# Patient Record
Sex: MALE | Race: WHITE | NOT HISPANIC OR LATINO | Employment: FULL TIME | URBAN - METROPOLITAN AREA
[De-identification: names, ages, dates, MRNs, and addresses within clinical notes are randomized per-mention and may not be internally consistent; named-entity substitution may affect disease eponyms.]

---
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
## 2023-01-30 ENCOUNTER — OFFICE VISIT (OUTPATIENT)
Dept: FAMILY MEDICINE CLINIC | Facility: CLINIC | Age: 18
End: 2023-01-30
Payer: COMMERCIAL

## 2023-01-30 VITALS
HEART RATE: 55 BPM | TEMPERATURE: 97.3 F | DIASTOLIC BLOOD PRESSURE: 62 MMHG | WEIGHT: 163.4 LBS | OXYGEN SATURATION: 98 % | SYSTOLIC BLOOD PRESSURE: 115 MMHG | BODY MASS INDEX: 22.16 KG/M2

## 2023-01-30 DIAGNOSIS — F51.01 PRIMARY INSOMNIA: Primary | ICD-10-CM

## 2023-01-30 DIAGNOSIS — F41.9 ANXIETY AND DEPRESSION: ICD-10-CM

## 2023-01-30 DIAGNOSIS — F32.A ANXIETY AND DEPRESSION: ICD-10-CM

## 2023-01-30 PROCEDURE — 99213 OFFICE O/P EST LOW 20 MIN: CPT | Performed by: NURSE PRACTITIONER

## 2023-01-30 RX ORDER — LAMOTRIGINE 25 MG/1
50 TABLET ORAL 2 TIMES DAILY
Qty: 120 TABLET | Refills: 1 | Status: SHIPPED | OUTPATIENT
Start: 2023-01-30 | End: 2023-02-16

## 2023-01-30 RX ORDER — TRAZODONE HYDROCHLORIDE 50 MG/1
TABLET ORAL
Qty: 60 TABLET | Refills: 2 | Status: SHIPPED | OUTPATIENT
Start: 2023-01-30

## 2023-01-30 NOTE — PATIENT INSTRUCTIONS
Generalized Anxiety Disorder, Adult  Generalized anxiety disorder (JUDY) is a mental health condition. Unlike normal worries, anxiety related to JUDY is not triggered by a specific event. These worries do not fade or get better with time. JUDY interferes with relationships, work, and school.  JUDY symptoms can vary from mild to severe. People with severe JUDY can have intense waves of anxiety with physical symptoms that are similar to panic attacks.  What are the causes?  The exact cause of JUDY is not known, but the following are believed to have an impact:  Differences in natural brain chemicals.  Genes passed down from parents to children.  Differences in the way threats are perceived.  Development and stress during childhood.  Personality.  What increases the risk?  The following factors may make you more likely to develop this condition:  Being female.  Having a family history of anxiety disorders.  Being very shy.  Experiencing very stressful life events, such as the death of a loved one.  Having a very stressful family environment.  What are the signs or symptoms?  People with JUDY often worry excessively about many things in their lives, such as their health and family. Symptoms may also include:  Mental and emotional symptoms:  Worrying excessively about natural disasters.  Fear of being late.  Difficulty concentrating.  Fears that others are judging your performance.  Physical symptoms:  Fatigue.  Headaches, muscle tension, muscle twitches, trembling, or feeling shaky.  Feeling like your heart is pounding or beating very fast.  Feeling out of breath or like you cannot take a deep breath.  Having trouble falling asleep or staying asleep, or experiencing restlessness.  Sweating.  Nausea, diarrhea, or irritable bowel syndrome (IBS).  Behavioral symptoms:  Experiencing erratic moods or irritability.  Avoidance of new situations.  Avoidance of people.  Extreme difficulty making decisions.  How is this diagnosed?  This  condition is diagnosed based on your symptoms and medical history. You will also have a physical exam. Your health care provider may perform tests to rule out other possible causes of your symptoms.  To be diagnosed with JUDY, a person must have anxiety that:  Is out of his or her control.  Affects several different aspects of his or her life, such as work and relationships.  Causes distress that makes him or her unable to take part in normal activities.  Includes at least three symptoms of JUDY, such as restlessness, fatigue, trouble concentrating, irritability, muscle tension, or sleep problems.  Before your health care provider can confirm a diagnosis of JUDY, these symptoms must be present more days than they are not, and they must last for 6 months or longer.  How is this treated?  This condition may be treated with:  Medicine. Antidepressant medicine is usually prescribed for long-term daily control. Anti-anxiety medicines may be added in severe cases, especially when panic attacks occur.  Talk therapy (psychotherapy). Certain types of talk therapy can be helpful in treating JUDY by providing support, education, and guidance. Options include:  Cognitive behavioral therapy (CBT). People learn coping skills and self-calming techniques to ease their physical symptoms. They learn to identify unrealistic thoughts and behaviors and to replace them with more appropriate thoughts and behaviors.  Acceptance and commitment therapy (ACT). This treatment teaches people how to be mindful as a way to cope with unwanted thoughts and feelings.  Biofeedback. This process trains you to manage your body's response (physiological response) through breathing techniques and relaxation methods. You will work with a therapist while machines are used to monitor your physical symptoms.  Stress management techniques. These include yoga, meditation, and exercise.  A mental health specialist can help determine which treatment is best for you.  Some people see improvement with one type of therapy. However, other people require a combination of therapies.  Follow these instructions at home:  Lifestyle  Maintain a consistent routine and schedule.  Anticipate stressful situations. Create a plan and allow extra time to work with your plan.  Practice stress management or self-calming techniques that you have learned from your therapist or your health care provider.  Exercise regularly and spend time outdoors.  Eat a healthy diet that includes plenty of vegetables, fruits, whole grains, low-fat dairy products, and lean protein.  Do not eat a lot of foods that are high in fat, added sugar, or salt (sodium).  Drink plenty of water.  Avoid alcohol. Alcohol can increase anxiety.  Avoid caffeine and certain over-the-counter cold medicines. These may make you feel worse. Ask your pharmacist which medicines to avoid.  General instructions  Take over-the-counter and prescription medicines only as told by your health care provider.  Understand that you are likely to have setbacks. Accept this and be kind to yourself as you persist to take better care of yourself.  Anticipate stressful situations. Create a plan and allow extra time to work with your plan.  Recognize and accept your accomplishments, even if you  them as small.  Spend time with people who care about you.  Keep all follow-up visits. This is important.  Where to find more information  [employer]: www.nimh.nih.gov  Substance Abuse and Mental Health Services: www.samhsa.gov  Contact a health care provider if:  Your symptoms do not get better.  Your symptoms get worse.  You have signs of depression, such as:  A persistently sad or irritable mood.  Loss of enjoyment in activities that used to bring you mikey.  Change in weight or eating.  Changes in sleeping habits.  Get help right away if:  You have thoughts about hurting yourself or others.  If you ever feel like you may hurt  yourself or others, or have thoughts about taking your own life, get help right away. Go to your nearest emergency department or:  Call your local emergency services (961 in the [place]  Call a suicide crisis helpline, such as the National Suicide Prevention Lifeline at 1-802.699.4860 or 614 in the [place] This is open 24 hours a day in the [place]  Text the Crisis Text Line at 852585 (in the [place]  Summary  Generalized anxiety disorder (JUDY) is a mental health condition that involves worry that is not triggered by a specific event.  People with JUDY often worry excessively about many things in their lives, such as their health and family.  JUDY may cause symptoms such as restlessness, trouble concentrating, sleep problems, frequent sweating, nausea, diarrhea, headaches, and trembling or muscle twitching.  A mental health specialist can help determine which treatment is best for you. Some people see improvement with one type of therapy. However, other people require a combination of therapies.  This information is not intended to replace advice given to you by your health care provider. Make sure you discuss any questions you have with your health care provider.  Document Revised: 07/13/2022 Document Reviewed: 04/10/2022  [employer] Patient Education © 2022 [employer]

## 2023-02-16 DIAGNOSIS — F32.A ANXIETY AND DEPRESSION: ICD-10-CM

## 2023-02-16 DIAGNOSIS — F41.9 ANXIETY AND DEPRESSION: ICD-10-CM

## 2023-02-16 RX ORDER — LAMOTRIGINE 25 MG/1
TABLET ORAL
Qty: 60 TABLET | Refills: 1 | Status: SHIPPED | OUTPATIENT
Start: 2023-02-16

## 2023-03-30 ENCOUNTER — TRANSCRIBE ORDERS (OUTPATIENT)
Dept: ADMINISTRATIVE | Facility: HOSPITAL | Age: 18
End: 2023-03-30
Payer: COMMERCIAL

## 2023-03-30 DIAGNOSIS — R10.13 ABDOMINAL PAIN, EPIGASTRIC: Primary | ICD-10-CM

## 2023-04-26 DIAGNOSIS — F41.9 ANXIETY AND DEPRESSION: ICD-10-CM

## 2023-04-26 DIAGNOSIS — F32.A ANXIETY AND DEPRESSION: ICD-10-CM

## 2023-04-26 RX ORDER — LAMOTRIGINE 25 MG/1
TABLET ORAL
Qty: 60 TABLET | Refills: 1 | Status: SHIPPED | OUTPATIENT
Start: 2023-04-26

## 2023-05-10 ENCOUNTER — HOSPITAL ENCOUNTER (OUTPATIENT)
Dept: GENERAL RADIOLOGY | Facility: HOSPITAL | Age: 18
Discharge: HOME | End: 2023-05-10
Admitting: NURSE PRACTITIONER
Payer: COMMERCIAL

## 2023-05-10 DIAGNOSIS — R10.13 ABDOMINAL PAIN, EPIGASTRIC: ICD-10-CM

## 2023-05-10 PROCEDURE — 74248 X-RAY SM INT F-THRU STD: CPT

## 2023-05-10 PROCEDURE — 74246 X-RAY XM UPR GI TRC 2CNTRST: CPT

## 2023-05-10 RX ADMIN — BARIUM SULFATE 135 ML: 980 POWDER, FOR SUSPENSION ORAL at 10:07

## 2023-05-10 RX ADMIN — BARIUM SULFATE 355 ML: 0.6 SUSPENSION ORAL at 10:07

## 2023-05-10 RX ADMIN — ANTACID/ANTIFLATULENT 1 PACKET: 380; 550; 10; 10 GRANULE, EFFERVESCENT ORAL at 10:07

## 2023-10-01 ENCOUNTER — APPOINTMENT (OUTPATIENT)
Dept: CT IMAGING | Facility: HOSPITAL | Age: 18
End: 2023-10-01
Payer: COMMERCIAL

## 2023-10-01 ENCOUNTER — HOSPITAL ENCOUNTER (EMERGENCY)
Facility: HOSPITAL | Age: 18
Discharge: HOME | End: 2023-10-02
Attending: EMERGENCY MEDICINE | Admitting: EMERGENCY MEDICINE
Payer: COMMERCIAL

## 2023-10-01 DIAGNOSIS — S09.90XA INJURY OF HEAD, INITIAL ENCOUNTER: ICD-10-CM
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
## 2023-10-01 DIAGNOSIS — S01.01XA LACERATION OF OCCIPITAL REGION OF SCALP, INITIAL ENCOUNTER: ICD-10-CM

## 2023-10-01 LAB
ALBUMIN SERPL-MCNC: 5.1 G/DL (ref 3.5–5.2)
ALBUMIN/GLOB SERPL: 2.2 G/DL
ALP SERPL-CCNC: 63 U/L (ref 39–117)
ALT SERPL W P-5'-P-CCNC: 14 U/L (ref 1–41)
ANION GAP SERPL CALCULATED.3IONS-SCNC: 12.6 MMOL/L (ref 5–15)
AST SERPL-CCNC: 16 U/L (ref 1–40)
BASOPHILS # BLD AUTO: 0.09 10*3/MM3 (ref 0–0.2)
BASOPHILS NFR BLD AUTO: 0.9 % (ref 0–1.5)
BILIRUB SERPL-MCNC: 0.9 MG/DL (ref 0–1.2)
BUN SERPL-MCNC: 14 MG/DL (ref 6–20)
BUN/CREAT SERPL: 13.1 (ref 7–25)
CALCIUM SPEC-SCNC: 9.8 MG/DL (ref 8.6–10.5)
CHLORIDE SERPL-SCNC: 99 MMOL/L (ref 98–107)
CO2 SERPL-SCNC: 28.4 MMOL/L (ref 22–29)
CREAT SERPL-MCNC: 1.07 MG/DL (ref 0.76–1.27)
DEPRECATED RDW RBC AUTO: 40.7 FL (ref 37–54)
EGFRCR SERPLBLD CKD-EPI 2021: 101.9 ML/MIN/1.73
EOSINOPHIL # BLD AUTO: 0.31 10*3/MM3 (ref 0–0.4)
EOSINOPHIL NFR BLD AUTO: 2.9 % (ref 0.3–6.2)
ERYTHROCYTE [DISTWIDTH] IN BLOOD BY AUTOMATED COUNT: 13.1 % (ref 12.3–15.4)
GLOBULIN UR ELPH-MCNC: 2.3 GM/DL
GLUCOSE SERPL-MCNC: 131 MG/DL (ref 65–99)
HCT VFR BLD AUTO: 43 % (ref 37.5–51)
HGB BLD-MCNC: 14.5 G/DL (ref 13–17.7)
HOLD SPECIMEN: NORMAL
HOLD SPECIMEN: NORMAL
IMM GRANULOCYTES # BLD AUTO: 0.05 10*3/MM3 (ref 0–0.05)
IMM GRANULOCYTES NFR BLD AUTO: 0.5 % (ref 0–0.5)
LYMPHOCYTES # BLD AUTO: 3.94 10*3/MM3 (ref 0.7–3.1)
LYMPHOCYTES NFR BLD AUTO: 37.4 % (ref 19.6–45.3)
MCH RBC QN AUTO: 29.3 PG (ref 26.6–33)
MCHC RBC AUTO-ENTMCNC: 33.7 G/DL (ref 31.5–35.7)
MCV RBC AUTO: 86.9 FL (ref 79–97)
MONOCYTES # BLD AUTO: 0.71 10*3/MM3 (ref 0.1–0.9)
MONOCYTES NFR BLD AUTO: 6.7 % (ref 5–12)
NEUTROPHILS NFR BLD AUTO: 5.43 10*3/MM3 (ref 1.7–7)
NEUTROPHILS NFR BLD AUTO: 51.6 % (ref 42.7–76)
NRBC BLD AUTO-RTO: 0 /100 WBC (ref 0–0.2)
PLATELET # BLD AUTO: 308 10*3/MM3 (ref 140–450)
PMV BLD AUTO: 8.9 FL (ref 6–12)
POTASSIUM SERPL-SCNC: 3.6 MMOL/L (ref 3.5–5.2)
PROT SERPL-MCNC: 7.4 G/DL (ref 6–8.5)
RBC # BLD AUTO: 4.95 10*6/MM3 (ref 4.14–5.8)
SODIUM SERPL-SCNC: 140 MMOL/L (ref 136–145)
WBC NRBC COR # BLD: 10.53 10*3/MM3 (ref 3.4–10.8)
WHOLE BLOOD HOLD COAG: NORMAL
WHOLE BLOOD HOLD SPECIMEN: NORMAL

## 2023-10-01 PROCEDURE — 90715 TDAP VACCINE 7 YRS/> IM: CPT | Performed by: PHYSICIAN ASSISTANT

## 2023-10-01 PROCEDURE — 85025 COMPLETE CBC W/AUTO DIFF WBC: CPT | Performed by: EMERGENCY MEDICINE

## 2023-10-01 PROCEDURE — 25010000002 KETOROLAC TROMETHAMINE PER 15 MG: Performed by: PHYSICIAN ASSISTANT

## 2023-10-01 PROCEDURE — 96374 THER/PROPH/DIAG INJ IV PUSH: CPT

## 2023-10-01 PROCEDURE — 25010000002 TETANUS-DIPHTH-ACELL PERTUSSIS 5-2.5-18.5 LF-MCG/0.5 SUSPENSION PREFILLED SYRINGE: Performed by: PHYSICIAN ASSISTANT

## 2023-10-01 PROCEDURE — 80053 COMPREHEN METABOLIC PANEL: CPT | Performed by: EMERGENCY MEDICINE

## 2023-10-01 PROCEDURE — 99284 EMERGENCY DEPT VISIT MOD MDM: CPT

## 2023-10-01 PROCEDURE — 72125 CT NECK SPINE W/O DYE: CPT

## 2023-10-01 PROCEDURE — 70450 CT HEAD/BRAIN W/O DYE: CPT

## 2023-10-01 PROCEDURE — 36415 COLL VENOUS BLD VENIPUNCTURE: CPT

## 2023-10-01 PROCEDURE — 90471 IMMUNIZATION ADMIN: CPT | Performed by: PHYSICIAN ASSISTANT

## 2023-10-01 RX ORDER — KETOROLAC TROMETHAMINE 30 MG/ML
15 INJECTION, SOLUTION INTRAMUSCULAR; INTRAVENOUS ONCE
Status: COMPLETED | OUTPATIENT
Start: 2023-10-01 | End: 2023-10-01

## 2023-10-01 RX ORDER — OMEPRAZOLE 40 MG/1
CAPSULE, DELAYED RELEASE ORAL
COMMUNITY
Start: 2023-06-20

## 2023-10-01 RX ORDER — SODIUM CHLORIDE 0.9 % (FLUSH) 0.9 %
10 SYRINGE (ML) INJECTION AS NEEDED
Status: DISCONTINUED | OUTPATIENT
Start: 2023-10-01 | End: 2023-10-02 | Stop reason: HOSPADM

## 2023-10-01 RX ADMIN — Medication 10 ML: at 21:32

## 2023-10-01 RX ADMIN — KETOROLAC TROMETHAMINE 15 MG: 30 INJECTION, SOLUTION INTRAMUSCULAR; INTRAVENOUS at 21:31

## 2023-10-01 RX ADMIN — TETANUS TOXOID, REDUCED DIPHTHERIA TOXOID AND ACELLULAR PERTUSSIS VACCINE, ADSORBED 0.5 ML: 5; 2.5; 8; 8; 2.5 SUSPENSION INTRAMUSCULAR at 21:30

## 2023-10-01 RX ADMIN — Medication 10 ML: at 21:30

## 2023-10-02 VITALS
TEMPERATURE: 97.5 F | OXYGEN SATURATION: 97 % | WEIGHT: 165.12 LBS | HEART RATE: 63 BPM | SYSTOLIC BLOOD PRESSURE: 119 MMHG | DIASTOLIC BLOOD PRESSURE: 68 MMHG | HEIGHT: 73 IN | RESPIRATION RATE: 14 BRPM | BODY MASS INDEX: 21.88 KG/M2

## 2023-10-02 RX ORDER — DICLOFENAC SODIUM 75 MG/1
75 TABLET, DELAYED RELEASE ORAL 2 TIMES DAILY PRN
Qty: 12 TABLET | Refills: 0 | Status: SHIPPED | OUTPATIENT
Start: 2023-10-02
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
